# Patient Record
(demographics unavailable — no encounter records)

---

## 2025-06-01 NOTE — ASSESSMENT
[FreeTextEntry1] :   Health Maintenance Counseling Nutrition: Stressed importance of moderation in sodium/caffeine intake, saturated fat and cholesterol, caloric balance, sufficient intake of fresh fruits, vegetables, fiber, calcium, and iron, - Exercise: Stressed the importance of 30-40 minutes of regular exercise. - Substance Abuse: Discussed cessation/primary prevention of tobacco, alcohol, or other drug use; driving or other dangerous activities under the influence; availability of treatment for abuse. - Sexuality: Discussed sexually transmitted diseases, partner selection, use of condoms, avoidance of unintended pregnancy and contraceptive alternatives. - Injury prevention: Discussed safety belts, safety helmets, smoke detector, smoking near bedding or upholstery. - Dental health: Discussed importance of regular tooth brushing, flossing, and dental visits. - Recommended annual skin cancer screening.  - Immunizations reviewed. # obesity Continue Zepbound weekly

## 2025-06-01 NOTE — HISTORY OF PRESENT ILLNESS
[FreeTextEntry1] : To establish care  [de-identified] : Mr. CONY TRAYLOR is a 39-year-old male with obesity, mild TOR not CPAP, HLD presents today to establish care.  Pt reports want to switch his PCP. He is currently on Zepbound 2.5 mg since April. Able to lose 12 lbs. Reports he plateaued on his weight for 2 weeks. Recently increased to 5 mg weekly. He did not start the 5 mg yet.  Reports eat healthy, runs 2.5 miles every other day, weightlifting for 45 min 3-4 week.  Had AWE and BW in April. Pt will upload the copy.  A1C 5.3 LFT - WNL  CMP - WNL  CBC - WNL

## 2025-06-01 NOTE — COUNSELING
[Potential consequences of obesity discussed] : Potential consequences of obesity discussed [Benefits of weight loss discussed] : Benefits of weight loss discussed [Encouraged to increase physical activity] : Encouraged to increase physical activity [Encouraged to maintain food diary] : Encouraged to maintain food diary [Encouraged to use exercise tracking device] : Encouraged to use exercise tracking device [Weigh Self Weekly] : weigh self weekly [Decrease Portions] : decrease portions [____ min/wk Activity] : [unfilled] min/wk activity [Keep Food Diary] : keep food diary [Good understanding] : Patient has a good understanding of disease, goals and obesity follow-up plan [FreeTextEntry4] : 15

## 2025-06-01 NOTE — HEALTH RISK ASSESSMENT
[Good] : ~his/her~  mood as  good [Yes] : Yes [Monthly or less (1 pt)] : Monthly or less (1 point) [1 or 2 (0 pts)] : 1 or 2 (0 points) [Never (0 pts)] : Never (0 points) [Never] : Never [With Family] : lives with family [Employed] : employed [de-identified] : smokes marijuana daily  [de-identified] : active, daily exercise [de-identified] : generally healthy  [FreeTextEntry2] : teacher

## 2025-06-01 NOTE — HEALTH RISK ASSESSMENT
[Good] : ~his/her~  mood as  good [Yes] : Yes [Monthly or less (1 pt)] : Monthly or less (1 point) [1 or 2 (0 pts)] : 1 or 2 (0 points) [Never (0 pts)] : Never (0 points) [Never] : Never [With Family] : lives with family [Employed] : employed [de-identified] : smokes marijuana daily  [de-identified] : active, daily exercise [de-identified] : generally healthy  [FreeTextEntry2] : teacher

## 2025-06-01 NOTE — HISTORY OF PRESENT ILLNESS
[FreeTextEntry1] : To establish care  [de-identified] : Mr. CONY TRAYLOR is a 39-year-old male with obesity, mild TOR not CPAP, HLD presents today to establish care.  Pt reports want to switch his PCP. He is currently on Zepbound 2.5 mg since April. Able to lose 12 lbs. Reports he plateaued on his weight for 2 weeks. Recently increased to 5 mg weekly. He did not start the 5 mg yet.  Reports eat healthy, runs 2.5 miles every other day, weightlifting for 45 min 3-4 week.  Had AWE and BW in April. Pt will upload the copy.  A1C 5.3 LFT - WNL  CMP - WNL  CBC - WNL